# Patient Record
Sex: FEMALE | Race: WHITE
[De-identification: names, ages, dates, MRNs, and addresses within clinical notes are randomized per-mention and may not be internally consistent; named-entity substitution may affect disease eponyms.]

---

## 2018-06-28 ENCOUNTER — HOSPITAL ENCOUNTER (EMERGENCY)
Dept: HOSPITAL 56 - MW.ED | Age: 28
Discharge: HOME | End: 2018-06-28
Payer: SELF-PAY

## 2018-06-28 DIAGNOSIS — J02.9: ICD-10-CM

## 2018-06-28 DIAGNOSIS — H65.91: Primary | ICD-10-CM

## 2018-06-28 NOTE — EDM.PDOC
ED HPI GENERAL MEDICAL PROBLEM





- General


Chief Complaint: ENT Problem


Stated Complaint: EAR ACHE, SORE THROAT


Time Seen by Provider: 06/28/18 11:40


Source of Information: Reports: Patient


History Limitations: Reports: No Limitations





- History of Present Illness


INITIAL COMMENTS - FREE TEXT/NARRATIVE: 





HISTORY AND PHYSICAL:


[]27-year-old female presenting with sore throat and ear pain





History of Present Illness:


[]Patient has had difficulties since Monday, 4 days ago 





Review of Systems:


As per history of present illness and below otherwise all 


systems reviewed and negative.  





Past medical history:


As per history of present illness and as reviewed below


otherwise noncontributory.





Surgical history:


As per history of present illness and as reviewed below


otherwise noncontributory.





Social history:


No reported history of drug or alcohol abuse.





Family history:


As per history of present illness and as reviewed below


otherwise noncontributory.





Physical exam:


Alert and oriented female answering questions appropriately in full sentences 

without any shortness of breath. She is nontoxic in appearance.


HEENT: Atraumatic, normocehpalic, pupils reactive, negative for conjunctival 

pallor or scleral icterus, mucous membranes moist, throat with erythema, neck 

supple, nontender, trachea midline.  Tympanic membrane on the right is yellow 

white with effusion erythema. Right cervical adenopathy tender upon palpation. 

Full range of motion is present


Lungs: Clear to auscultation, breath sounds equal bilaterally, chest non 

tender.  


Heart: S1S2, regular, negative for clicks, rubs, or JVD.


Abdomen: Soft, nondistended, nontender.  Negative for masses or 

hepatossplenmegaly. Negative for costovertebral tenderness.


Pelvis: Stable nontender.


Genitourinary: Deferred.


Rectal: Deferred


Extremities: Atraumatic, negative for cords or calf pain.  


Neurovascular unremarkable.


Neuro:  Awake, alert, oriented.  Cranial nerves II through XII


unremarkable.  Cerebellum unremarkable.  Motor and sensory unremarkable 

throughout.  Exam nonfocal.  





Diagnostics:


[]





Therapeutics:


[]





Impression:


[]Right otitis media with effusion


Pharyngitis





Plan:


[]Discharged home


Antibiotics of Augmentin


Antibiotic drops of Cortisporin


Recommend follow-up with ENT specialist Dr. Richi ANDERSON [facility]


Specialty care-ENT


[address] [place] [address]


[phone]


Fax: [phone]





Return to the emergency room as directed and discussed








Definitive disposition and diagnosis as appropriate pending


reevaluation and review of above.  





Onset: Gradual


Duration: Day(s): (4)


Location: Reports: Head


Quality: Reports: Ache, Pressure


Severity: Moderate


Improves with: Reports: None


Worsens with: Reports: None


Associated Symptoms: Reports: No Other Symptoms


Treatments PTA: Reports: Acetaminophen, NSAIDS


  ** Right Ear


Pain Score (Numeric/FACES): 5





- Related Data


 Allergies











Allergy/AdvReac Type Severity Reaction Status Date / Time


 


No Known Allergies Allergy   Verified 06/28/18 11:49











Home Meds: 


 Home Meds





Amoxicillin/Potassium Clav [Augmentin 875-125 Tablet] 1 each PO BID #20 tablet 

06/28/18 [Rx]


Hydrocort/Neomycin/Polymyxin B [Neomycin-Polymixin-HC Otic Susp] 10 ml EARRT 

Q6HRRT 5 Days #1 bottle 06/28/18 [Rx]











ED ROS ENT





- Review of Systems


Review Of Systems: ROS reveals no pertinent complaints other than HPI.





ED EXAM, ENT





- Physical Exam


Exam: See Below (See dictation)





Course





- Vital Signs


Last Recorded V/S: 


 Last Vital Signs











Temp  36.7 C   06/28/18 11:43


 


Pulse  86   06/28/18 11:43


 


Resp  18   06/28/18 11:43


 


BP  139/76   06/28/18 11:43


 


Pulse Ox  96   06/28/18 11:43














Departure





- Departure


Time of Disposition: 11:54


Disposition: Home, Self-Care 01


Condition: Good


Clinical Impression: 


Otitis media


Qualifiers:


 Otitis media type: unspecified Chronicity: acute Qualified Code(s): H66.90 - 

Otitis media, unspecified, unspecified ear








- Discharge Information


Prescriptions: 


Amoxicillin/Potassium Clav [Augmentin 875-125 Tablet] 1 each PO BID #20 tablet


Hydrocort/Neomycin/Polymyxin B [Neomycin-Polymixin-HC Otic Susp] 10 ml EARRT 

Q6HRRT 5 Days #1 bottle


Instructions:  Ear Drops, Adult, Easy-to-Read, Otitis Media, Adult, Easy-to-Read


Referrals: 


PCP,Not In Area [Primary Care Provider] - 


Africa Stoddard MD [Physician] - 


Forms:  ED Department Discharge


Additional Instructions: 


The following information is given to patients seen in the emergency department 

who are being discharged to home. This information is to outline your options 

for follow-up care. We provide all patients seen in our emergency department 

with a follow-up referral.





The need for follow-up, as well as the timing and circumstances, are variable 

depending upon the specifics of your emergency department visit.





If you don't have a primary care physician on staff, we will provide you with a 

referral. We always advise you to contact your personal physician following an 

emergency department visit to inform them of the circumstance of the visit and 

for follow-up with them and/or the need for any referrals to a consulting 

specialist.





The emergency department will also refer you to a specialist when appropriate. 

This referral assures that you have the opportunity for followup care with a 

specialist. All of these measure are taken in an effort to provide you with 

optimal care, which includes your followup.





Under all circumstances we always encourage you to contact your private 

physician who remains a resource for coordinating  your care. When calling for 

followup care, please make the office aware that this follow-up is from your 

recent emergency room visit. If for any reason you are refused follow-up, 

please contact the [facility] emergency department at [phone] 

and asked to speak to the emergency department charge nurse.





SHe was found to have otitis media "ear infection" that has a fusion which is 

fluid behind the clinic membrane(ear drum)


Drops were issued to reduce the inflammation and will reduce the pain


Antibiotics are also ordered


Referral has been made for you to see heat and follow-up with Dr. Africa Stoddard


[facility]


Specialty care-ENT


[address] [place] [id number]


[phone]


Fax: [phone]





Return to the emergency room as directed and discussed